# Patient Record
Sex: FEMALE | Race: WHITE | NOT HISPANIC OR LATINO | Employment: UNEMPLOYED | URBAN - METROPOLITAN AREA
[De-identification: names, ages, dates, MRNs, and addresses within clinical notes are randomized per-mention and may not be internally consistent; named-entity substitution may affect disease eponyms.]

---

## 2022-11-16 RX ORDER — LIDOCAINE 40 MG/G
CREAM TOPICAL
Status: CANCELLED | OUTPATIENT
Start: 2022-11-16

## 2022-11-16 RX ORDER — ONDANSETRON 2 MG/ML
4 INJECTION INTRAMUSCULAR; INTRAVENOUS
Status: CANCELLED | OUTPATIENT
Start: 2022-11-16

## 2022-11-17 ENCOUNTER — HOSPITAL ENCOUNTER (OUTPATIENT)
Facility: CLINIC | Age: 62
Discharge: HOME | End: 2022-11-17
Attending: COLON & RECTAL SURGERY | Admitting: COLON & RECTAL SURGERY
Payer: COMMERCIAL

## 2022-11-17 VITALS
BODY MASS INDEX: 31.92 KG/M2 | WEIGHT: 187 LBS | OXYGEN SATURATION: 98 % | RESPIRATION RATE: 18 BRPM | HEIGHT: 64 IN | SYSTOLIC BLOOD PRESSURE: 145 MMHG | DIASTOLIC BLOOD PRESSURE: 86 MMHG | HEART RATE: 68 BPM

## 2022-11-17 LAB — COLONOSCOPY: NORMAL

## 2022-11-17 PROCEDURE — G0121 COLON CA SCRN NOT HI RSK IND: HCPCS | Performed by: COLON & RECTAL SURGERY

## 2022-11-17 PROCEDURE — 45378 DIAGNOSTIC COLONOSCOPY: CPT | Performed by: COLON & RECTAL SURGERY

## 2022-11-17 PROCEDURE — 250N000011 HC RX IP 250 OP 636: Performed by: COLON & RECTAL SURGERY

## 2022-11-17 PROCEDURE — G0500 MOD SEDAT ENDO SERVICE >5YRS: HCPCS | Performed by: COLON & RECTAL SURGERY

## 2022-11-17 RX ORDER — FENTANYL CITRATE 50 UG/ML
INJECTION, SOLUTION INTRAMUSCULAR; INTRAVENOUS PRN
Status: DISCONTINUED | OUTPATIENT
Start: 2022-11-17 | End: 2022-11-17 | Stop reason: HOSPADM

## 2022-11-17 RX ORDER — RALOXIFENE HYDROCHLORIDE 60 MG/1
1 TABLET, FILM COATED ORAL DAILY
COMMUNITY
Start: 2021-10-20

## 2022-11-17 ASSESSMENT — ACTIVITIES OF DAILY LIVING (ADL): ADLS_ACUITY_SCORE: 35

## 2022-11-17 NOTE — H&P
Colon & Rectal Surgery History and Physical  Pre-Endoscopy Procedure Note    History of Present Illness   I have been asked by Dr. Acosta to evaluate this 62 year old female for colorectal cancer screening. She currently denies any abdominal pain, weight loss, bleeding per rectum, or recent change in bowel habits.    Past Medical History  Diagnosis Date     NO ACTIVE PROBLEMS        Past Surgical History  Procedure Laterality Date     GYN SURGERY       NECK SURGERY          Medications  Medication Sig     Cholecalciferol (VITAMIN D3 PO) Take 1,000 Units by mouth daily     Multiple Vitamin (MULTIVITAMIN) per tablet Take 1 tablet by mouth daily.     raloxifene (EVISTA) 60 MG tablet Take 1 tablet by mouth daily     zolpidem (AMBIEN) 5 MG tablet Take 5 mg by mouth nightly as needed.       Allergies  Allergen Reactions     NKDA [No Known Drug Allergies]         Family History   Family history includes Breast Cancer in her mother; Diabetes in her father; Hypertension in her mother and unknown relative.     Social History   She reports that she has never smoked. She does not have any smokeless tobacco history on file. She reports that she does not currently use alcohol. She reports that she does not use drugs.    Review of Systems   Constitutional:  No fever, weight change or fatigue.    Eyes:     No dry eyes or vision changes.   Ears/Nose/Throat/Neck:  No oral ulcers, sore throat or voice change.    Cardiovascular:   No palpitations, syncope, angina or edema.   Respiratory:    No chest pain, excessive sleepiness, shortness of breath or hemoptysis.    Gastrointestinal:   No abdominal pain, nausea, vomiting, diarrhea or heartburn.    Genitourinary:   No dysuria, hematuria, urinary retention or urinary frequency.   Musculoskeletal:  No joint swelling or arthralgias.    Dermatologic:  No skin rash or other skin changes.   Neurologic:    No focal weakness or numbness. No neuropathy.   Psychiatric:    No depression, anxiety,  "suicidal ideation, or paranoid ideation.   Endocrine:   No cold or heat intolerance, polydipsia, hirsutism, change in libido, or flushing.   Hematology/Lymphatic:  No bleeding or lymphadenopathy.    Allergy/Immunology:  No rhinitis or hives.     Physical Exam   Vitals:  /90, HR 74, RR 18, height 1.613 m (5' 3.5\"), weight 84.8 kg (187 lb), SpO2 100 %.    General:  Alert and oriented to person, place and time   Airway: Normal oropharyngeal airway and neck mobility   Lungs:  Clear bilaterally   Heart:  Regular rate and rhythm   Abdomen: Soft, NT, ND, no masses   Extremities: Warm, good capillary refill    ASA Grade: II (mild systemic disease)    Impression: Cleared for use of conscious sedation for colorectal cancer screening    Plan: Proceed with colonoscopy     Veronica Moeller MD  [facility]  [phone]  "

## 2023-04-21 ENCOUNTER — DOCUMENTATION ONLY (OUTPATIENT)
Dept: CONSULT | Facility: CLINIC | Age: 63
End: 2023-04-21
Payer: COMMERCIAL

## 2023-04-21 NOTE — PROGRESS NOTES
No prior authorization is required for genetic counseling.    Vikki Diez MA  - Genetics  Phone: [phone]  Fax: [phone]  [email or web address]

## 2023-04-25 ENCOUNTER — VIRTUAL VISIT (OUTPATIENT)
Dept: CONSULT | Facility: CLINIC | Age: 63
End: 2023-04-25
Attending: GENETIC COUNSELOR, MS
Payer: COMMERCIAL

## 2023-04-25 DIAGNOSIS — Z84.89 FAMILY HISTORY OF GENETIC DISEASE: ICD-10-CM

## 2023-04-25 DIAGNOSIS — G62.9 NEUROPATHY: Primary | ICD-10-CM

## 2023-04-25 PROCEDURE — 999N000069 HC STATISTIC GENETIC COUNSELING, < 16 MIN: Mod: GT,95 | Performed by: GENETIC COUNSELOR, MS

## 2023-04-25 NOTE — Clinical Note
4/25/2023      RE: Carmen Chou  [address]  [place] [address]     Dear Colleague,    Thank you for the opportunity to participate in the care of your patient, Carmen Chou, at the [facility] at [facility]. Please see a copy of my visit note below.    Carmen Chou was seen for a genetic counseling appointment at the request of Dr. Acosta today given her family history of CMT1A. She was accompanied by her  Faisal Chou.    Pertinent Medical History: Carmen is a 62 year old female with numbness bilaterally in her feet which may or may not be caused by her history of bilateral bone surgery. 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 Yudy reports no history of muscle weakness, neurologic evaluation, structural changes to the feet (high arches, flat feet, hammertoes), hearing loss, and balance problems.     Family History: A three generation pedigree was obtained today and scanned into the EMR. This family history is by patient report only and has not been verified with medical records except where noted. The following information is significant:     Yudy has three sons and one daughter. Her son (age 34) is alive and well and has had negative PMP22 genetic testing. Yudy's son, Flavio (age 36), has tested positive for a full gene PMP22 duplication confirming a diagnosis of CMT1A. He has a history of mild CMT symptoms and high arches beginning at 18-24 months of age. He has one son (age 1 year) who testing negative the familial PMP22 duplication. Yudy's son (age 29) is alive and well and has had negative PMP22 gene testing. Yudy's daughter (age 25) is adopted and is alive and well.     Yudy has two brothers and two sisters who are alive and well.  Yudy's nieces and nephews are alive and well.     Yudy's father  at age 88 due to old age. He had a history of neuropathy in his right leg resulting in numbness and weakness and COPD. He did not have diabetes, cancer or accidental trauma in his lifetime.     Yudy's mother (age 91) has high blood pressure, high cholesterol and a pacemaker but is otherwise healthy.    Ancestry is Bel[place]ian, Yoruba and Telugu. Consanguinity was denied.    Discussion:  John's son, Flavio, has a genetic diagnosis of CMT1A and would like to pursue PGT/IVF to ensure that his future child is not affected with this condition. In order to complete PGT testing, Flavio's parents need to be tested for the PMP22 duplication identified in him.    CMT1A is this most common type of CMT, encompassing about 55% of genetically defined CMT and 36% of all CMT diagnoses. CMT1A is a peripheral neuropathy that causes damage to the myelin in two types of the nerves, the motor nerves (involved in muscle movement) and the sensory nerves (involved in sensation or feeling). Damage to motor nerves causes muscle weakness and difficulty with muscle movement, especially in the hands and feet. This can lead to difficulty walking, writing, putting on jewelry and many other types of movement. Weakness in the small muscles of the foot can also lead to changes in the structure of the foot such as hammer toes or high/low arches. Damage to the sensory nerves can cause numbness, tingling and impaired balance, which can lead to fatigue.    CMT1A is highly variable within members of the same family. Individuals may not experience any symptoms, these symptoms may be mild, or they might begin as mild and gradually become more severe. For some individuals, signs of neuropathy may be present in childhood, with about 10% of children experiencing delayed walking. In contrast, about 30% of people with this diagnosis do not experience symptoms until the 3rd decade of life  or later. CMT1A does not impact lifespan or intelligence. The symptoms of CMT are generally very slowly progressive and less than 5% of individuals with CMT will need a wheelchair during their lifetime.    Information about genes, chromosomes and inheritance was reviewed. Chromosomes are made of DNA and are the packaging that contain our genes. Genes are the instructions that tell our bodies how to build proteins. These proteins function all over our body to keep us healthy. Typically, everyone has two copies of every gene. They inherit one from their mother one from their father. CMT1A is caused by a duplication in the PMP22 gene. Instead of two copies of the PMP22 gene (one on each chromosome), individuals with CMT1A have three copies of the PMP22 gene (two on one chromosome and one on the other chromosome). This causes the body to produce too much protein which leads to the symptoms we see in CMT1A.    CMT1A is inherited in an autosomal dominant manner. This means that only one extra copy of the PMP22 gene is necessary for an individual to show symptoms of CMT1A. Children get one copy of their chromosome from their mother and one from their father. This means there is a 50% chance that someone with CMT1A will pass on the chromosome containing one copy of PMP22, resulting in a child without CMT1A and there is a 50% chance that they will pass on the chromosome containing two copies of PMP22, resulting in a child with CMT1A.      Genetic testing is available for the PMP22 gene duplication. However, this will not be covered by insurance and would result in a 250 dollar cost. Alternatively, a sponsored comprehensive neuropathy panel is available that would test the PMP22 gene and over 100 other genes related to neuropathy free of charge. This is a research program and requires [facility] to share their deidentified data with research teams. Risks, benefits and limitations of each of these options were reviewed. A  positive result would allow Flavio to undergo PGT testing and would also have implications for Jean Pierre or Yudy as well as other family members. A negative result in both parents would remove PGT as an option for Flavio for family planning.    Jean Pierre and Yudy expressed an excellent understanding of this information and decided to proceed with the sponsored comprehensive neuropathy panel at [facility].      Plan:  1. Sponsored comprehensive neuropathy panel at [facility]  2. Return pending results of above testing  3. Contact information was provided should any questions arise in the future.     Noni Can [facility]  Genetic Counselor  Division of Genetics and Metabolism  (p) [phone]  (f) [phone]     Total time spent in consultation with the family was approximately 15 minutes    Cc: No Letter        Please do not hesitate to contact me if you have any questions/concerns.     Sincerely,       Noni Can GC

## 2023-04-25 NOTE — NURSING NOTE
Is the patient currently in the state of [place]? YES    Visit mode:VIDEO    If the visit is dropped, the patient can be reconnected by: Patient connected under 's appointment with provider.    Will anyone else be joining the visit?       How would you like to obtain your AVS? MyChart    Are changes needed to the allergy or medication list? NO    Reason for visit: Video Visit (New patient )

## 2023-04-26 NOTE — PROGRESS NOTES
Carmen Chou was seen for a genetic counseling appointment at the request of Dr. Acosta today given her family history of CMT1A. She was accompanied by her  Faisal Chou.    Pertinent Medical History: Carmen is a 62 year old female with numbness bilaterally in her feet which may or may not be caused by her history of bilateral bone surgery. 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 Yudy reports no history of muscle weakness, neurologic evaluation, structural changes to the feet (high arches, flat feet, hammertoes), hearing loss, and balance problems.     Family History: A three generation pedigree was obtained today and scanned into the EMR. This family history is by patient report only and has not been verified with medical records except where noted. The following information is significant:     Yudy has three sons and one daughter. Her son (age 34) is alive and well and has had negative PMP22 genetic testing. Yudy's son, Flavio (age 36), has tested positive for a full gene PMP22 duplication confirming a diagnosis of CMT1A. He has a history of mild CMT symptoms and high arches beginning at 18-24 months of age. He has one son (age 1 year) who testing negative the familial PMP22 duplication. Yudy's son (age 29) is alive and well and has had negative PMP22 gene testing. Yudy's daughter (age 25) is adopted and is alive and well.     Yudy has two brothers and two sisters who are alive and well. Yudy's nieces and nephews are alive and well.     Yudy's father  at age 88 due to old age. He had a history of neuropathy in his right leg resulting in numbness and weakness and COPD. He did not have diabetes, cancer or accidental trauma in his lifetime.     Yudy's mother (age 91) has high blood pressure, high cholesterol and a pacemaker but is otherwise  healthy.    Ancestry is Guyanese, Hungarian and Danish. Consanguinity was denied.    Discussion:  John's son, Flavio, has a genetic diagnosis of CMT1A and would like to pursue PGT/IVF to ensure that his future child is not affected with this condition. In order to complete PGT testing, Flavio's parents need to be tested for the PMP22 duplication identified in him.    CMT1A is this most common type of CMT, encompassing about 55% of genetically defined CMT and 36% of all CMT diagnoses. CMT1A is a peripheral neuropathy that causes damage to the myelin in two types of the nerves, the motor nerves (involved in muscle movement) and the sensory nerves (involved in sensation or feeling). Damage to motor nerves causes muscle weakness and difficulty with muscle movement, especially in the hands and feet. This can lead to difficulty walking, writing, putting on jewelry and many other types of movement. Weakness in the small muscles of the foot can also lead to changes in the structure of the foot such as hammer toes or high/low arches. Damage to the sensory nerves can cause numbness, tingling and impaired balance, which can lead to fatigue.    CMT1A is highly variable within members of the same family. Individuals may not experience any symptoms, these symptoms may be mild, or they might begin as mild and gradually become more severe. For some individuals, signs of neuropathy may be present in childhood, with about 10% of children experiencing delayed walking. In contrast, about 30% of people with this diagnosis do not experience symptoms until the 3rd decade of life or later. CMT1A does not impact lifespan or intelligence. The symptoms of CMT are generally very slowly progressive and less than 5% of individuals with CMT will need a wheelchair during their lifetime.    Information about genes, chromosomes and inheritance was reviewed. Chromosomes are made of DNA and are the packaging that contain our genes. Genes are the  instructions that tell our bodies how to build proteins. These proteins function all over our body to keep us healthy. Typically, everyone has two copies of every gene. They inherit one from their mother one from their father. CMT1A is caused by a duplication in the PMP22 gene. Instead of two copies of the PMP22 gene (one on each chromosome), individuals with CMT1A have three copies of the PMP22 gene (two on one chromosome and one on the other chromosome). This causes the body to produce too much protein which leads to the symptoms we see in CMT1A.    CMT1A is inherited in an autosomal dominant manner. This means that only one extra copy of the PMP22 gene is necessary for an individual to show symptoms of CMT1A. Children get one copy of their chromosome from their mother and one from their father. This means there is a 50% chance that someone with CMT1A will pass on the chromosome containing one copy of PMP22, resulting in a child without CMT1A and there is a 50% chance that they will pass on the chromosome containing two copies of PMP22, resulting in a child with CMT1A.      Genetic testing is available for the PMP22 gene duplication. However, this will not be covered by insurance and would result in a 250 dollar cost. Alternatively, a sponsored comprehensive neuropathy panel is available that would test the PMP22 gene and over 100 other genes related to neuropathy free of charge. This is a research program and requires [facility] to share their deidentified data with research teams. Risks, benefits and limitations of each of these options were reviewed. A positive result would allow Flavio to undergo PGT testing and would also have implications for Jean Pierre or Yudy as well as other family members. A negative result in both parents would remove PGT as an option for Flavio for family planning.    Maxime expressed an excellent understanding of this information and decided to proceed with the sponsored  comprehensive neuropathy panel at [facility].      Plan:  1. Sponsored comprehensive neuropathy panel at [facility]  2. Return pending results of above testing  3. Contact information was provided should any questions arise in the future.     Noni Can MS [facility]  Genetic Counselor  Division of Genetics and Metabolism  (p) [phone]  (f) [phone]     Total time spent in consultation with the family was approximately 15 minutes    Cc: No Letter

## 2023-04-27 ENCOUNTER — LAB (OUTPATIENT)
Dept: LAB | Facility: CLINIC | Age: 63
End: 2023-04-27
Payer: COMMERCIAL

## 2023-04-27 DIAGNOSIS — G62.9 NEUROPATHY: ICD-10-CM

## 2023-04-27 DIAGNOSIS — Z84.89 FAMILY HISTORY OF GENETIC DISEASE: ICD-10-CM

## 2023-04-27 PROCEDURE — 99000 SPECIMEN HANDLING OFFICE-LAB: CPT

## 2023-04-27 PROCEDURE — 36415 COLL VENOUS BLD VENIPUNCTURE: CPT

## 2023-05-09 LAB — SCANNED LAB RESULT: NORMAL

## 2023-05-10 ENCOUNTER — TELEPHONE (OUTPATIENT)
Dept: CONSULT | Facility: CLINIC | Age: 63
End: 2023-05-10
Payer: COMMERCIAL

## 2023-05-10 NOTE — TELEPHONE ENCOUNTER
Contacted Yudy and Jean Pierre and reviewed the results of their genetic testing. We discussed that they do not have the PMP22 gene duplication identified in their son Falvio. This means that PGT will not be an option for Flavio and his wife at this time. We also discussed that the rest of the 102 gene comprehensive neuropathy panel was negative or normal. Yudy and Jean Pierre expressed an excellent understanding of this information and asked that I call their son Flavio to share this information. A copy of their results will be sent via mail.    Noni Can MS [facility]  Genetic Counselor  Division of Genetics and Metabolism  (p) [phone]  (f) [phone]

## 2023-05-27 ENCOUNTER — HEALTH MAINTENANCE LETTER (OUTPATIENT)
Age: 63
End: 2023-05-27

## 2024-01-07 ENCOUNTER — HEALTH MAINTENANCE LETTER (OUTPATIENT)
Age: 64
End: 2024-01-07

## 2024-12-21 ENCOUNTER — HEALTH MAINTENANCE LETTER (OUTPATIENT)
Age: 64
End: 2024-12-21

## 2025-06-14 ENCOUNTER — HEALTH MAINTENANCE LETTER (OUTPATIENT)
Age: 65
End: 2025-06-14

## (undated) RX ORDER — FENTANYL CITRATE 50 UG/ML
INJECTION, SOLUTION INTRAMUSCULAR; INTRAVENOUS
Status: DISPENSED
Start: 2022-11-17